# Patient Record
Sex: FEMALE | Race: WHITE | NOT HISPANIC OR LATINO | Employment: FULL TIME | URBAN - NONMETROPOLITAN AREA
[De-identification: names, ages, dates, MRNs, and addresses within clinical notes are randomized per-mention and may not be internally consistent; named-entity substitution may affect disease eponyms.]

---

## 2018-04-25 ENCOUNTER — OFFICE VISIT (OUTPATIENT)
Dept: URGENT CARE | Facility: PHYSICIAN GROUP | Age: 23
End: 2018-04-25
Payer: COMMERCIAL

## 2018-04-25 VITALS
DIASTOLIC BLOOD PRESSURE: 72 MMHG | HEART RATE: 84 BPM | WEIGHT: 148 LBS | BODY MASS INDEX: 23.23 KG/M2 | RESPIRATION RATE: 18 BRPM | OXYGEN SATURATION: 97 % | SYSTOLIC BLOOD PRESSURE: 118 MMHG | TEMPERATURE: 98.2 F | HEIGHT: 67 IN

## 2018-04-25 DIAGNOSIS — S39.012A BACK STRAIN, INITIAL ENCOUNTER: ICD-10-CM

## 2018-04-25 PROCEDURE — 99214 OFFICE O/P EST MOD 30 MIN: CPT | Performed by: FAMILY MEDICINE

## 2018-04-25 RX ORDER — KETOROLAC TROMETHAMINE 30 MG/ML
60 INJECTION, SOLUTION INTRAMUSCULAR; INTRAVENOUS ONCE
Status: CANCELLED | OUTPATIENT
Start: 2018-04-25 | End: 2018-04-25

## 2018-04-25 RX ORDER — PREDNISONE 20 MG/1
TABLET ORAL
Qty: 9 TAB | Refills: 0 | Status: CANCELLED | OUTPATIENT
Start: 2018-04-25

## 2018-04-25 RX ORDER — CYCLOBENZAPRINE HCL 10 MG
TABLET ORAL
Qty: 21 TAB | Refills: 0 | Status: SHIPPED | OUTPATIENT
Start: 2018-04-25 | End: 2021-08-01

## 2018-04-25 NOTE — LETTER
April 25, 2018         Patient: Minna Tyler   YOB: 1995   Date of Visit: 4/25/2018           To Whom it May Concern:    Minna Tyler was seen in my clinic on 4/25/2018.    Please excuse from work for 4/25 thru and including 4/27/18 due to medical condition.    If you have any questions or concerns, please don't hesitate to call.        Sincerely,           Anuel Lee M.D.  Electronically Signed

## 2018-04-26 NOTE — PROGRESS NOTES
Chief Complaint:    Chief Complaint   Patient presents with   • Back Pain     Pt states she was lifting the night before, woke up with stiff pain in her lower back.       History of Present Illness:    This is a new problem. She has pain in left mid back and across entire lower back. She thinks she hurt her back at gym on 4/23/18 doing dead lifts with possible incorrect technique using her back some to lift. Her physically demanding job further aggravated the pain. Laying down is more comfortable for the pain. No radiating pain down legs. No loss of bowel/bladder control. She is having trouble sleeping due to pain.      Review of Systems:    Constitutional: Negative for fever, chills, and diaphoresis.   Eyes: Negative for change in vision, photophobia, pain, redness, and discharge.  ENT: Negative for ear pain, ear discharge, hearing loss, tinnitus, nasal congestion, nosebleeds, and sore throat.    Respiratory: Negative for cough, hemoptysis, sputum production, shortness of breath, wheezing, and stridor.    Cardiovascular: Negative for chest pain, palpitations, orthopnea, claudication, leg swelling, and PND.   Gastrointestinal: Negative for abdominal pain, nausea, vomiting, diarrhea, constipation, blood in stool, and melena.   Genitourinary: Negative for dysuria, urinary urgency, urinary frequency, hematuria, and flank pain.   Musculoskeletal: See HPI.  Skin: Negative for rash and itching.   Neurological: Negative for dizziness, tingling, tremors, sensory change, speech change, focal weakness, seizures, loss of consciousness, and headaches.   Endo: Negative for polydipsia.   Heme: Does not bruise/bleed easily.   Psychiatric/Behavioral: Negative for depression, suicidal ideas, hallucinations, memory loss and substance abuse. The patient is not nervous/anxious and does not have insomnia.        Past Medical History:    History reviewed. No pertinent past medical history.    Past Surgical History:    History reviewed.  "No pertinent surgical history.    Social History:    Social History     Social History   • Marital status: Single     Spouse name: N/A   • Number of children: N/A   • Years of education: N/A     Occupational History   • Not on file.     Social History Main Topics   • Smoking status: Never Smoker   • Smokeless tobacco: Never Used   • Alcohol use No      Comment: occassional   • Drug use: No   • Sexual activity: Yes     Partners: Male     Birth control/ protection: Pill, Condom      Comment: single with a SO, unemployed     Other Topics Concern   • Not on file     Social History Narrative   • No narrative on file     Family History:    Family History   Problem Relation Age of Onset   • Other Mother      basically healthy, no chronic conditions   • Other Father      basically healthy   • Cancer Paternal Grandfather       from cancer, origin unknown   • Cancer Maternal Grandmother      maternal family history of breast cancer   • Diabetes Maternal Grandmother      maternal family history of diabetes       Medications:    Current Outpatient Prescriptions on File Prior to Visit   Medication Sig Dispense Refill   • norgestimate-ethinyl estradiol (ORTHO-CYCLEN) 0.25-35 MG-MCG per tablet Take 1 Tab by mouth every day. 28 Tab 11     No current facility-administered medications on file prior to visit.      Allergies:    No Known Allergies      Vitals:    Vitals:    18 1711   BP: 118/72   Pulse: 84   Resp: 18   Temp: 36.8 °C (98.2 °F)   SpO2: 97%   Weight: 67.1 kg (148 lb)   Height: 1.702 m (5' 7\")       Physical Exam:    Constitutional: Vital signs reviewed. Appears well-developed and well-nourished. No acute distress.   Eyes: Sclera white, conjunctivae clear.  ENT: External ears normal. Hearing normal.  Pulmonary/Chest: Respirations non-labored.  Musculoskeletal: Severely decreased lumbar extension otherwise mild-moderately decreased lumbar range of motion due to back pain. Normal gait. No tenderness to palpation. " No muscular atrophy or weakness.  Neurological: Alert and oriented to person, place, and time. Muscle tone normal. Coordination normal. Light touch and sensation normal.  Skin: No rashes or lesions. Warm, dry, normal turgor.  Psychiatric: Normal mood and affect. Behavior is normal. Judgment and thought content normal.       Assessment / Plan:    1. Back strain, initial encounter  - cyclobenzaprine (FLEXERIL) 10 MG Tab; 1 TAB EVERY 8 HOURS ONLY IF NEEDED FOR PAIN, MUSCLE SPASM, AND/OR MUSCLE TIGHTNESS. MAY CAUSE DROWSINESS.  Dispense: 21 Tab; Refill: 0      Work note given - excuse for 4/25 thru and including 4/27/18.     Discussed with her DDX and management options.    Rec'd relative rest.    Declines Toradol IM and Rx for steroid course for anti-inflammatory effect.    Agreeable to medication prescribed.    Follow-up with PCP or urgent care if getting worse or not better with time and above.

## 2019-09-12 ENCOUNTER — OFFICE VISIT (OUTPATIENT)
Dept: URGENT CARE | Facility: PHYSICIAN GROUP | Age: 24
End: 2019-09-12
Payer: COMMERCIAL

## 2019-09-12 VITALS
WEIGHT: 169 LBS | SYSTOLIC BLOOD PRESSURE: 138 MMHG | BODY MASS INDEX: 26.47 KG/M2 | RESPIRATION RATE: 16 BRPM | DIASTOLIC BLOOD PRESSURE: 80 MMHG | OXYGEN SATURATION: 96 % | HEART RATE: 104 BPM | TEMPERATURE: 98 F

## 2019-09-12 DIAGNOSIS — F41.9 ANXIETY: ICD-10-CM

## 2019-09-12 DIAGNOSIS — F32.A DEPRESSION, UNSPECIFIED DEPRESSION TYPE: ICD-10-CM

## 2019-09-12 PROCEDURE — 99214 OFFICE O/P EST MOD 30 MIN: CPT | Performed by: PHYSICIAN ASSISTANT

## 2019-09-12 RX ORDER — HYDROXYZINE HYDROCHLORIDE 10 MG/1
10 TABLET, FILM COATED ORAL 3 TIMES DAILY PRN
Qty: 30 TAB | Refills: 0 | Status: SHIPPED | OUTPATIENT
Start: 2019-09-12 | End: 2021-08-01

## 2019-09-12 ASSESSMENT — ENCOUNTER SYMPTOMS
NERVOUS/ANXIOUS: 1
MEMORY LOSS: 0
HEADACHES: 0
BLURRED VISION: 0
PALPITATIONS: 0
DEPRESSION: 1
RESTLESSNESS: 1
HYPERVENTILATION: 1
HALLUCINATIONS: 0
INSOMNIA: 1
DOUBLE VISION: 0
FEELING OF CHOKING: 1

## 2019-09-12 ASSESSMENT — PATIENT HEALTH QUESTIONNAIRE - PHQ9
CLINICAL INTERPRETATION OF PHQ2 SCORE: 5
5. POOR APPETITE OR OVEREATING: 2 - MORE THAN HALF THE DAYS
SUM OF ALL RESPONSES TO PHQ QUESTIONS 1-9: 21

## 2019-09-12 ASSESSMENT — LIFESTYLE VARIABLES: SUBSTANCE_ABUSE: 1

## 2019-09-12 NOTE — PROGRESS NOTES
Subjective:   Minna Tyler is a 23 y.o. female who presents today with   Chief Complaint   Patient presents with   • Anxiety     x 1 week    • Depression     Patient's mother is present  Anxiety   Presents for initial visit. Onset was in the past 7 days. The problem has been unchanged. Symptoms include excessive worry, feeling of choking, hyperventilation, insomnia, nervous/anxious behavior and restlessness. Patient reports no chest pain, palpitations or suicidal ideas. Symptoms occur most days. The severity of symptoms is interfering with daily activities. Exacerbated by: Patient states she does not want to leave the house. The quality of sleep is poor.     Risk factors include alcohol intake. Her past medical history is significant for anxiety/panic attacks and depression.   Depression   Pertinent negatives include no chest pain or headaches.   Patient states that she has had worsening of depression and anxiety which she has been dealing with for a long time now.  She states that has gotten worse since being switched to night shift at work.  Patient denies any plan of hurting herself or any interest in hurting herself but she does feel as though she does not want to exist.  Patient does states she has been using alcohol and marijuana.  Patient states that marijuana has helped her sleep and relax.  It has also increased her appetite.  Patient denies any recent triggers or issues that have been going on.  Patient does report that she has a strong support system and lives at home with her parents and has friends.  She is interested in seeking counseling at this point.  Patient states she has anxiety when in public and has been unable to go to work. She states she would rather stay at home. She denies history of self harm.   PMH:  has no past medical history on file.  MEDS:   Current Outpatient Medications:   •  hydrOXYzine HCl (ATARAX) 10 MG Tab, Take 1 Tab by mouth 3 times a day as needed for Anxiety., Disp: 30  Tab, Rfl: 0  •  cyclobenzaprine (FLEXERIL) 10 MG Tab, 1 TAB EVERY 8 HOURS ONLY IF NEEDED FOR PAIN, MUSCLE SPASM, AND/OR MUSCLE TIGHTNESS. MAY CAUSE DROWSINESS. (Patient not taking: Reported on 9/12/2019), Disp: 21 Tab, Rfl: 0  •  norgestimate-ethinyl estradiol (ORTHO-CYCLEN) 0.25-35 MG-MCG per tablet, Take 1 Tab by mouth every day. (Patient not taking: Reported on 9/12/2019), Disp: 28 Tab, Rfl: 11  ALLERGIES: No Known Allergies  SURGHX: History reviewed. No pertinent surgical history.  SOCHX:  reports that she has never smoked. She has never used smokeless tobacco. She reports that she does not drink alcohol or use drugs.  FH: Reviewed with patient, not pertinent to this visit.     Review of Systems   Eyes: Negative for blurred vision and double vision.   Cardiovascular: Negative for chest pain and palpitations.   Neurological: Negative for headaches.   Psychiatric/Behavioral: Positive for depression and substance abuse (alcohol/marijuana). Negative for hallucinations, memory loss and suicidal ideas. The patient is nervous/anxious and has insomnia.    All other systems reviewed and are negative.       Objective:   /80   Pulse (!) 104   Temp 36.7 °C (98 °F) (Temporal)   Resp 16   Wt 76.7 kg (169 lb)   SpO2 96%   BMI 26.47 kg/m²   Physical Exam   Constitutional: She appears well-developed and well-nourished. No distress.   HENT:   Head: Normocephalic and atraumatic.   Right Ear: Hearing normal.   Left Ear: Hearing normal.   Eyes: Pupils are equal, round, and reactive to light.   Cardiovascular: Normal rate, regular rhythm and normal heart sounds.   Pulmonary/Chest: Effort normal and breath sounds normal.   Musculoskeletal:   Normal movement in all 4 extremities   Neurological: She is alert. Coordination normal.   Skin: Skin is warm and dry.   Psychiatric: Her mood appears anxious. She exhibits a depressed mood.   Nursing note and vitals reviewed.    Assessment/Plan:   Assessment    1. Anxiety  - REFERRAL  TO BEHAVIORAL HEALTH  - hydrOXYzine HCl (ATARAX) 10 MG Tab; Take 1 Tab by mouth 3 times a day as needed for Anxiety.  Dispense: 30 Tab; Refill: 0    2. Depression, unspecified depression type  - REFERRAL TO BEHAVIORAL HEALTH  Patient will not take Hydroxizine with alcohol. Patient will follow up with behavioral health for counseling.    Patient given instructions and understanding of medications and treatment.    If not improving in 3-5 days, F/U with PCP or return to  if symptoms worsen.  Strict ER precautions given of any SI/HI occur.   Patient agreeable to plan.      Please note that this dictation was created using voice recognition software. I have made every reasonable attempt to correct obvious errors, but I expect that there are errors of grammar and possibly content that I did not discover before finalizing the note.    Hipolito Garza PA-C

## 2020-03-09 ENCOUNTER — OFFICE VISIT (OUTPATIENT)
Dept: URGENT CARE | Facility: PHYSICIAN GROUP | Age: 25
End: 2020-03-09
Payer: COMMERCIAL

## 2020-03-09 VITALS
TEMPERATURE: 98 F | SYSTOLIC BLOOD PRESSURE: 122 MMHG | RESPIRATION RATE: 14 BRPM | DIASTOLIC BLOOD PRESSURE: 82 MMHG | OXYGEN SATURATION: 95 % | HEART RATE: 88 BPM | BODY MASS INDEX: 25.88 KG/M2 | WEIGHT: 164.9 LBS | HEIGHT: 67 IN

## 2020-03-09 DIAGNOSIS — M94.0 COSTOCHONDRITIS: ICD-10-CM

## 2020-03-09 DIAGNOSIS — R05.9 COUGH: ICD-10-CM

## 2020-03-09 PROCEDURE — 99214 OFFICE O/P EST MOD 30 MIN: CPT | Performed by: NURSE PRACTITIONER

## 2020-03-09 RX ORDER — IBUPROFEN 200 MG
200 TABLET ORAL EVERY 6 HOURS PRN
COMMUNITY
End: 2021-08-01

## 2020-03-09 RX ORDER — METHYLPREDNISOLONE 4 MG/1
TABLET ORAL
Qty: 21 TAB | Refills: 0 | Status: SHIPPED | OUTPATIENT
Start: 2020-03-09 | End: 2021-08-01

## 2020-03-09 SDOH — HEALTH STABILITY: MENTAL HEALTH: HOW OFTEN DO YOU HAVE A DRINK CONTAINING ALCOHOL?: 2-4 TIMES A MONTH

## 2020-03-09 ASSESSMENT — ENCOUNTER SYMPTOMS
MYALGIAS: 0
DIZZINESS: 0
CHILLS: 0
SHORTNESS OF BREATH: 0
SORE THROAT: 0
WHEEZING: 0
EYE PAIN: 0
VOMITING: 0
NAUSEA: 0
FEVER: 0
COUGH: 1

## 2020-03-09 NOTE — PROGRESS NOTES
"Subjective:   Minna Tyler  is a 24 y.o. female who presents for Cough (had a cough for about a week that is pretty much gone but she is having pain in her right lung/chest )        Cough   This is a new problem. The current episode started in the past 7 days. The problem has been unchanged. The problem occurs constantly. The cough is non-productive. Pertinent negatives include no chest pain, chills, fever, myalgias, rash, sore throat, shortness of breath or wheezing. Associated symptoms comments: Right side chest wall pain  . Exacerbated by: coughing, deep breathing. She has tried OTC cough suppressant for the symptoms. The treatment provided no relief. There is no history of asthma, bronchitis or pneumonia.     Review of Systems   Constitutional: Negative for chills, fever and malaise/fatigue.   HENT: Negative for sore throat.    Eyes: Negative for pain.   Respiratory: Positive for cough. Negative for shortness of breath and wheezing.    Cardiovascular: Negative for chest pain.   Gastrointestinal: Negative for nausea and vomiting.   Genitourinary: Negative for hematuria.   Musculoskeletal: Negative for myalgias.        Chest wall pain     Skin: Negative for rash.   Neurological: Negative for dizziness.     No Known Allergies   Objective:   /82   Pulse 88   Temp 36.7 °C (98 °F) (Temporal)   Resp 14   Ht 1.702 m (5' 7\")   Wt 74.8 kg (164 lb 14.4 oz)   SpO2 95%   BMI 25.83 kg/m²   Physical Exam  Vitals signs and nursing note reviewed.   Constitutional:       General: She is not in acute distress.     Appearance: She is well-developed.   HENT:      Head: Normocephalic and atraumatic.      Right Ear: Tympanic membrane and external ear normal.      Left Ear: Tympanic membrane and external ear normal.      Nose: Nose normal.      Right Sinus: No maxillary sinus tenderness or frontal sinus tenderness.      Left Sinus: No maxillary sinus tenderness or frontal sinus tenderness.      Mouth/Throat:      Mouth: " Mucous membranes are moist.      Pharynx: Uvula midline. No posterior oropharyngeal erythema.      Tonsils: No tonsillar exudate or tonsillar abscesses.   Eyes:      General:         Right eye: No discharge.         Left eye: No discharge.      Conjunctiva/sclera: Conjunctivae normal.      Pupils: Pupils are equal, round, and reactive to light.   Cardiovascular:      Rate and Rhythm: Normal rate and regular rhythm.      Heart sounds: No murmur.   Pulmonary:      Effort: Pulmonary effort is normal. No respiratory distress.      Breath sounds: Normal breath sounds.   Chest:      Chest wall: Tenderness present. No swelling, crepitus or edema.      Breasts: Breasts are symmetrical.          Comments: Exquisitely tender to palpation of the intercostal space.  Chest wall symmetrical  Abdominal:      General: There is no distension.      Palpations: Abdomen is soft.      Tenderness: There is no abdominal tenderness.   Musculoskeletal: Normal range of motion.   Skin:     General: Skin is warm and dry.   Neurological:      General: No focal deficit present.      Mental Status: She is alert and oriented to person, place, and time. Mental status is at baseline.      Gait: Gait (gait at baseline) normal.   Psychiatric:         Judgment: Judgment normal.           Assessment/Plan:     1. Costochondritis  methylPREDNISolone (MEDROL DOSEPAK) 4 MG Tablet Therapy Pack   2. Cough       Patient declining Toradol injection  Advised may take 600-800 mg ibuprofen 3 times daily as needed for pain.   Differential diagnosis, natural history, supportive care, and indications for immediate follow-up discussed.

## 2020-03-09 NOTE — LETTER
March 9, 2020         Patient: Minna Tyler   YOB: 1995   Date of Visit: 3/9/2020           To Whom it May Concern:    Minna Tyler was seen in my clinic on 3/9/2020. She may return to work on 3/9/2020. Please allow light duty work for one week.     If you have any questions or concerns, please don't hesitate to call.        Sincerely,           HEATHER Woods.  Electronically Signed

## 2020-03-23 ENCOUNTER — OFFICE VISIT (OUTPATIENT)
Dept: URGENT CARE | Facility: PHYSICIAN GROUP | Age: 25
End: 2020-03-23
Payer: COMMERCIAL

## 2020-03-23 ENCOUNTER — APPOINTMENT (OUTPATIENT)
Dept: RADIOLOGY | Facility: IMAGING CENTER | Age: 25
End: 2020-03-23
Attending: INTERNAL MEDICINE
Payer: COMMERCIAL

## 2020-03-23 VITALS
HEART RATE: 74 BPM | WEIGHT: 171 LBS | SYSTOLIC BLOOD PRESSURE: 120 MMHG | BODY MASS INDEX: 26.78 KG/M2 | TEMPERATURE: 97.5 F | DIASTOLIC BLOOD PRESSURE: 82 MMHG | RESPIRATION RATE: 16 BRPM | OXYGEN SATURATION: 98 %

## 2020-03-23 DIAGNOSIS — R05.9 COUGH: ICD-10-CM

## 2020-03-23 DIAGNOSIS — R07.1 PAIN OF ANTERIOR CHEST WALL WITH RESPIRATION: ICD-10-CM

## 2020-03-23 PROCEDURE — 71101 X-RAY EXAM UNILAT RIBS/CHEST: CPT | Mod: TC,FY,RT | Performed by: INTERNAL MEDICINE

## 2020-03-23 PROCEDURE — 99204 OFFICE O/P NEW MOD 45 MIN: CPT | Mod: 25 | Performed by: INTERNAL MEDICINE

## 2020-03-23 PROCEDURE — 99406 BEHAV CHNG SMOKING 3-10 MIN: CPT | Performed by: INTERNAL MEDICINE

## 2020-03-23 RX ORDER — MELOXICAM 15 MG/1
15 TABLET ORAL DAILY
Qty: 10 TAB | Refills: 0 | Status: SHIPPED | OUTPATIENT
Start: 2020-03-23 | End: 2020-04-02

## 2020-03-23 ASSESSMENT — ENCOUNTER SYMPTOMS
SPUTUM PRODUCTION: 0
DIZZINESS: 0
COUGH: 1
FEVER: 0
SHORTNESS OF BREATH: 0

## 2020-03-23 ASSESSMENT — PAIN SCALES - GENERAL: PAINLEVEL: 4=SLIGHT-MODERATE PAIN

## 2020-03-23 NOTE — PROGRESS NOTES
Subjective:     Minna Tyler is a 24 y.o. female who presents for Rib Pain (R rib radiates to her R mid back / Pt was here last 2wks ago for the same reason )       Chest Pain    This is a new problem. The current episode started 1 to 4 weeks ago. The onset quality is gradual. The problem occurs constantly. The problem has been gradually worsening. The pain is present in the lateral region. The pain is moderate. The quality of the pain is described as sharp. The pain does not radiate. Associated symptoms include a cough (Patient had bronchitis more than a month ago that has gotten better but now she has smoker's cough). Pertinent negatives include no dizziness, fever, shortness of breath or sputum production.   History reviewed. No pertinent past medical history.History reviewed. No pertinent surgical history.  Social History     Socioeconomic History   • Marital status: Single     Spouse name: Not on file   • Number of children: Not on file   • Years of education: Not on file   • Highest education level: Not on file   Occupational History   • Not on file   Social Needs   • Financial resource strain: Not on file   • Food insecurity     Worry: Not on file     Inability: Not on file   • Transportation needs     Medical: Not on file     Non-medical: Not on file   Tobacco Use   • Smoking status: Current Every Day Smoker     Types: Cigarettes   • Smokeless tobacco: Never Used   Substance and Sexual Activity   • Alcohol use: Yes     Frequency: 2-4 times a month     Comment: occassional   • Drug use: Yes     Types: Marijuana     Comment: daily   • Sexual activity: Yes     Partners: Male     Birth control/protection: Pill, Condom     Comment: single with a SO, unemployed   Lifestyle   • Physical activity     Days per week: Not on file     Minutes per session: Not on file   • Stress: Not on file   Relationships   • Social connections     Talks on phone: Not on file     Gets together: Not on file     Attends Anabaptism  service: Not on file     Active member of club or organization: Not on file     Attends meetings of clubs or organizations: Not on file     Relationship status: Not on file   • Intimate partner violence     Fear of current or ex partner: Not on file     Emotionally abused: Not on file     Physically abused: Not on file     Forced sexual activity: Not on file   Other Topics Concern   • Not on file   Social History Narrative   • Not on file      Family History   Problem Relation Age of Onset   • Other Mother         basically healthy, no chronic conditions   • Other Father         basically healthy   • Cancer Paternal Grandfather          from cancer, origin unknown   • Cancer Maternal Grandmother         maternal family history of breast cancer   • Diabetes Maternal Grandmother         maternal family history of diabetes    Review of Systems   Constitutional: Negative for fever.   Respiratory: Positive for cough (Patient had bronchitis more than a month ago that has gotten better but now she has smoker's cough). Negative for sputum production and shortness of breath.    Cardiovascular: Positive for chest pain.   Neurological: Negative for dizziness.   All other systems reviewed and are negative.  No Known Allergies   Objective:   /82   Pulse 74   Temp 36.4 °C (97.5 °F) (Temporal)   Resp 16   Wt 77.6 kg (171 lb)   SpO2 98%   BMI 26.78 kg/m²   Physical Exam  Constitutional:       General: She is not in acute distress.     Appearance: She is well-developed.   HENT:      Head: Normocephalic and atraumatic.      Mouth/Throat:      Mouth: Mucous membranes are moist.      Pharynx: Oropharynx is clear.   Eyes:      Conjunctiva/sclera: Conjunctivae normal.   Neck:      Musculoskeletal: No neck rigidity.   Cardiovascular:      Rate and Rhythm: Normal rate and regular rhythm.   Pulmonary:      Effort: Pulmonary effort is normal. No respiratory distress.      Breath sounds: Normal breath sounds.   Chest:       Chest wall: Tenderness (Chest pain on the right anterior chest wall that is reproducible) present.   Lymphadenopathy:      Cervical: No cervical adenopathy.   Skin:     General: Skin is warm and dry.      Capillary Refill: Capillary refill takes less than 2 seconds.   Neurological:      Mental Status: She is alert and oriented to person, place, and time.      Sensory: No sensory deficit.      Deep Tendon Reflexes: Reflexes are normal and symmetric.   Psychiatric:         Mood and Affect: Mood normal.         Behavior: Behavior normal.           Assessment/Plan:   Assessment    1. Pain of anterior chest wall with respiration  - meloxicam (MOBIC) 15 MG tablet; Take 1 Tab by mouth every day for 10 days. No motrin,alleve or aspirin  Dispense: 10 Tab; Refill: 0  - [id number] (WITH 1-VIEW CXR) RIGHT; Future    2. Cough    FINDINGS:  No displaced fractures or acute bony changes are noted.  There is no evidence of a hemo or pneumothorax.     IMPRESSION:     Negative right rib series.          Tobacco use cessation counseling greater than 3 minutes was given including 800 quit now, gum, patch, Chantix    High risk conditions including including PE, pneumonia, rib fracture, lung contusion, pneumothorax was considered in the differential diagnosis  Differential diagnosis, natural history, supportive care, and indications for immediate follow-up discussed.

## 2021-08-01 ENCOUNTER — APPOINTMENT (OUTPATIENT)
Dept: URGENT CARE | Facility: PHYSICIAN GROUP | Age: 26
End: 2021-08-01
Payer: COMMERCIAL

## 2021-08-01 ENCOUNTER — HOSPITAL ENCOUNTER (OUTPATIENT)
Facility: MEDICAL CENTER | Age: 26
End: 2021-08-01
Attending: PHYSICIAN ASSISTANT
Payer: OTHER GOVERNMENT

## 2021-08-01 ENCOUNTER — OFFICE VISIT (OUTPATIENT)
Dept: URGENT CARE | Facility: PHYSICIAN GROUP | Age: 26
End: 2021-08-01

## 2021-08-01 VITALS
RESPIRATION RATE: 14 BRPM | WEIGHT: 183 LBS | HEART RATE: 68 BPM | HEIGHT: 67 IN | SYSTOLIC BLOOD PRESSURE: 122 MMHG | OXYGEN SATURATION: 99 % | DIASTOLIC BLOOD PRESSURE: 78 MMHG | TEMPERATURE: 98.8 F | BODY MASS INDEX: 28.72 KG/M2

## 2021-08-01 DIAGNOSIS — R68.89 FLU-LIKE SYMPTOMS: ICD-10-CM

## 2021-08-01 PROCEDURE — 99213 OFFICE O/P EST LOW 20 MIN: CPT | Performed by: PHYSICIAN ASSISTANT

## 2021-08-01 PROCEDURE — U0003 INFECTIOUS AGENT DETECTION BY NUCLEIC ACID (DNA OR RNA); SEVERE ACUTE RESPIRATORY SYNDROME CORONAVIRUS 2 (SARS-COV-2) (CORONAVIRUS DISEASE [COVID-19]), AMPLIFIED PROBE TECHNIQUE, MAKING USE OF HIGH THROUGHPUT TECHNOLOGIES AS DESCRIBED BY CMS-2020-01-R: HCPCS

## 2021-08-01 PROCEDURE — U0005 INFEC AGEN DETEC AMPLI PROBE: HCPCS

## 2021-08-01 NOTE — PROGRESS NOTES
"Chief Complaint   Patient presents with   • Altered Taste     x7 days   • Diarrhea     x2 days   • Nasal Congestion     body aches       HISTORY OF PRESENT ILLNESS: Patient is a 25 y.o. female who presents today for the following:    Body aches started   Loss of taste/smell  + loose stool  Had 2 \"at home COVID tests\" come back positive  No h/o COVID vaccine    Patient Active Problem List    Diagnosis Date Noted   • Contraception management 2013   • Right hip pain 2013       Allergies:Patient has no known allergies.    No current [facility]-ordered outpatient medications on file.     No current [facility]-ordered facility-administered medications on file.       History reviewed. No pertinent past medical history.    Social History     Tobacco Use   • Smoking status: Current Every Day Smoker     Types: Cigarettes   • Smokeless tobacco: Never Used   Substance Use Topics   • Alcohol use: Yes     Comment: occassional   • Drug use: Not Currently     Types: Marijuana     Comment: daily       Family Status   Relation Name Status   • Mo  Alive        35 in    • Fa  Alive        36 in    • PGFa   at age 24        cancer unknown origin   • MGMo  (Not Specified)     Family History   Problem Relation Age of Onset   • Other Mother         basically healthy, no chronic conditions   • Other Father         basically healthy   • Cancer Paternal Grandfather          from cancer, origin unknown   • Cancer Maternal Grandmother         maternal family history of breast cancer   • Diabetes Maternal Grandmother         maternal family history of diabetes       Review of Systems:   Constitutional ROS: No unexpected change in weight  Pulmonary ROS: No chronic cough, sputum, or hemoptysis, No dyspnea on exertion, No wheezing  Cardiovascular ROS: No diaphoresis, No edema, No palpitations  Hematologic/Lymphatic ROS: No chills, No night sweats, No weight loss  Skin/Integumentary ROS: No edema, No evidence of rash, No " "itching    Exam:  /78   Pulse 68   Temp 37.1 °C (98.8 °F)   Resp 14   Ht 1.702 m (5' 7\")   Wt 83 kg (183 lb)   SpO2 99%   General: Well developed, well nourished. No distress.    Eye: PERRL/EOMI; conjunctivae clear, lids normal.  ENMT: Lips without lesions, MMM. Oropharynx is clear. Bilateral TMs are within normal limits.  Pulmonary: Unlabored respiratory effort. Lungs clear to auscultation, no wheezes, no rhonchi.    Cardiovascular: Regular rate and rhythm without murmur.   Neurologic: Grossly nonfocal. No facial asymmetry noted.  Lymph: No cervical lymphadenopathy noted.  Skin: Warm, dry, good turgor. No rashes in visible areas.   Psych: Normal mood. Alert and oriented to person, place and time.    Assessment/Plan:  Discussed likely viral etiology.  Vitals and exam are unremarkable.  Low suspicion for pneumonia. Patient will be tested for COVID and has been advised to quarantine until results are available. Discussed appropriate over-the-counter symptomatic medication, and when to return to clinic. Follow up for worsening or persistent symptoms.  1. Flu-like symptoms  SARS-CoV-2, PCR (In-House): Collect NP OR nasal swab in [facility]       "

## 2021-08-01 NOTE — LETTER
August 1, 2021         Patient: Minna Tyler   YOB: 1995   Date of Visit: 8/1/2021           To Whom it May Concern:    Minna Tyler was seen in my clinic on 8/1/2021.     Please excuse patient for missing school/work until COVID results are available, typically taking 1-3 days.  They have been advised to quarantine during this time.      If you have any questions or concerns, please don't hesitate to call.        Sincerely,           Brenda White P.A.-C.  Electronically Signed

## 2021-08-02 DIAGNOSIS — R68.89 FLU-LIKE SYMPTOMS: ICD-10-CM

## 2021-08-03 LAB
COVID ORDER STATUS COVID19: NORMAL
SARS-COV-2 RNA RESP QL NAA+PROBE: DETECTED
SPECIMEN SOURCE: ABNORMAL

## 2022-05-24 NOTE — LETTER
March 23, 2020       Patient: Minna Tyler   YOB: 1995   Date of Visit: 3/23/2020         To Whom It May Concern:    It is my medical opinion that Minna Tyler remain out of work until 3/24/20 and no lifting more than 10 lbs till 3/30/20.    If you have any questions or concerns, please don't hesitate to call [phone]          Sincerely,          Torres Downey M.D.  Electronically Signed     
4 = No assist / stand by assistance

## 2022-05-25 ENCOUNTER — OFFICE VISIT (OUTPATIENT)
Dept: URGENT CARE | Facility: PHYSICIAN GROUP | Age: 27
End: 2022-05-25
Payer: COMMERCIAL

## 2022-05-25 VITALS
BODY MASS INDEX: 27.31 KG/M2 | OXYGEN SATURATION: 97 % | SYSTOLIC BLOOD PRESSURE: 122 MMHG | DIASTOLIC BLOOD PRESSURE: 62 MMHG | TEMPERATURE: 98.6 F | HEIGHT: 67 IN | HEART RATE: 79 BPM | RESPIRATION RATE: 18 BRPM | WEIGHT: 174 LBS

## 2022-05-25 DIAGNOSIS — Z48.02 ENCOUNTER FOR REMOVAL OF SUTURES: ICD-10-CM

## 2022-05-25 DIAGNOSIS — S81.011A LACERATION OF RIGHT KNEE, INITIAL ENCOUNTER: ICD-10-CM

## 2022-05-25 PROCEDURE — 99213 OFFICE O/P EST LOW 20 MIN: CPT | Performed by: NURSE PRACTITIONER

## 2022-05-25 ASSESSMENT — ENCOUNTER SYMPTOMS
FEVER: 0
BRUISES/BLEEDS EASILY: 0
CHILLS: 0
MYALGIAS: 0
WEAKNESS: 0
TINGLING: 0
SENSORY CHANGE: 0

## 2022-05-25 NOTE — LETTER
May 25, 2022    To Whom It May Concern:         This is confirmation that Minna Tyler attended her scheduled appointment with [facility] on 5/25/22. Please allow light duty for today through next 3 days. Please avoid climbing stairs and ladders, and no push/pull, carry/lift > 25 pounds or squatting down.           If you have any questions please do not hesitate to call me at the phone number listed below.    Sincerely,          Bety Coffman A.P.R.N.  [phone]

## 2022-05-25 NOTE — PROGRESS NOTES
"Subjective     Minna Tyler is a 26 y.o. female who presents with Suture / Staple Removal (Suture removal. Placed 10 days ago. 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 Applying triple antibiotic ointment with gauze bandage. 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     PMH:  has no past medical history on file.  MEDS: No current outpatient medications on file.  ALLERGIES: No Known Allergies  SURGHX: History reviewed. No pertinent surgical history.  SOCHX:  reports that she has been smoking cigarettes. She has never used smokeless tobacco. She reports current alcohol use. She reports previous drug use. Drug: Marijuana.  FH: Family history was reviewed, no pertinent findings to report    Review of Systems   Constitutional: Negative for chills, fever and malaise/fatigue.   Musculoskeletal: Negative for joint pain and myalgias.   Skin: Negative for itching and rash.        Suture removal from right knee laceration.    Neurological: Negative for tingling, sensory change and weakness.   Endo/Heme/Allergies: Does not bruise/bleed easily.   All other systems reviewed and are negative.             Objective     /62   Pulse 79   Temp 37 °C (98.6 °F) (Temporal)   Resp 18   Ht 1.702 m (5' 7\")   Wt 78.9 kg (174 lb)   SpO2 97%   BMI 27.25 kg/m²      Physical Exam  Vitals reviewed.   Constitutional:       General: She is awake. She is not in acute distress.     Appearance: Normal appearance. She is well-developed. She is not ill-appearing, toxic-appearing or diaphoretic.   HENT:      Head: Normocephalic.   Cardiovascular:      Rate and Rhythm: Normal rate.   Pulmonary:      Effort: Pulmonary effort is normal.   Musculoskeletal:         General: Tenderness present. No deformity.      Right knee: No swelling, deformity, " effusion, erythema, ecchymosis, lacerations, bony tenderness or crepitus. Normal range of motion. No tenderness. No LCL laxity, MCL laxity, ACL laxity or PCL laxity. Normal alignment, normal meniscus and normal patellar mobility. Normal pulse.   Skin:     General: Skin is warm and dry.      Findings: Signs of injury, laceration and wound present. No abrasion, bruising, ecchymosis or erythema.      Comments: Intact suture line with scabbing. Removed sutures without difficulty or pain with med assist. Applied telfa and ace wrap.    Neurological:      Mental Status: She is alert and oriented to person, place, and time.   Psychiatric:         Behavior: Behavior is cooperative.                             Assessment & Plan        1. Laceration of right knee, initial encounter      2. Encounter for removal of sutures      -May use over the counter NSAID as needed for discomfort  -Keep bandage clean and dry, remove and change daily, if soiled or wet remove and replace  -Mild soap and water, do not scrub, pat dry  -May use triple antibiotic ointment after each bandage change and cleaning  -May use ice for any swelling or discomfort  -Monitor for increased swelling, redness and increased heat to site, discharge, fever, red streaking- need re-evaluation immediately  Work note provided

## 2022-08-02 ENCOUNTER — OFFICE VISIT (OUTPATIENT)
Dept: URGENT CARE | Facility: PHYSICIAN GROUP | Age: 27
End: 2022-08-02
Payer: COMMERCIAL

## 2022-08-02 VITALS
RESPIRATION RATE: 18 BRPM | WEIGHT: 171 LBS | HEART RATE: 88 BPM | OXYGEN SATURATION: 96 % | TEMPERATURE: 97 F | SYSTOLIC BLOOD PRESSURE: 132 MMHG | DIASTOLIC BLOOD PRESSURE: 72 MMHG | HEIGHT: 67 IN | BODY MASS INDEX: 26.84 KG/M2

## 2022-08-02 DIAGNOSIS — Z32.01 PREGNANCY CONFIRMED BY POSITIVE URINE TEST: ICD-10-CM

## 2022-08-02 LAB
INT CON NEG: NORMAL
INT CON POS: NORMAL
POC URINE PREGNANCY TEST: POSITIVE

## 2022-08-02 PROCEDURE — 81025 URINE PREGNANCY TEST: CPT | Performed by: FAMILY MEDICINE

## 2022-08-02 PROCEDURE — 99213 OFFICE O/P EST LOW 20 MIN: CPT | Performed by: FAMILY MEDICINE

## 2022-08-02 NOTE — LETTER
August 2, 2022         Patient: Minna Tyler   YOB: 1995   Date of Visit: 8/2/2022           To Whom it May Concern:    Minna Tyler was seen in my clinic on 8/2/2022.     She is currently pregnant, confirmed by our testing today.    Please limit her lifting to 25 pounds or less.    If you have any questions or concerns, please don't hesitate to call.        Sincerely,           Anuel Lee M.D.  Electronically Signed

## 2022-08-02 NOTE — PROGRESS NOTES
Chief Complaint:    Chief Complaint   Patient presents with   • Pregnancy Test     Requesting. 4 POS at home test today       History of Present Illness:    4 positive home pregnancy tests today. Last menstrual period -22. Trouble finding OB. Requesting note for work to limit lifting - she has to sometimes lift 200 pounds at work.      Past Medical History:    History reviewed. No pertinent past medical history.    Past Surgical History:    History reviewed. No pertinent surgical history.    Social History:    Social History     Socioeconomic History   • Marital status: Single     Spouse name: Not on file   • Number of children: Not on file   • Years of education: Not on file   • Highest education level: Not on file   Occupational History   • Not on file   Tobacco Use   • Smoking status: Current Every Day Smoker     Types: Cigarettes   • Smokeless tobacco: Never Used   Substance and Sexual Activity   • Alcohol use: Yes     Comment: occassional   • Drug use: Not Currently     Types: Marijuana     Comment: daily   • Sexual activity: Yes     Partners: Male     Birth control/protection: Pill, Condom     Comment: single with a SO, unemployed   Other Topics Concern   • Not on file   Social History Narrative   • Not on file     Social Determinants of Health     Financial Resource Strain: Not on file   Food Insecurity: Not on file   Transportation Needs: Not on file   Physical Activity: Not on file   Stress: Not on file   Social Connections: Not on file   Intimate Partner Violence: Not on file   Housing Stability: Not on file     Family History:    Family History   Problem Relation Age of Onset   • Other Mother         basically healthy, no chronic conditions   • Other Father         basically healthy   • Cancer Paternal Grandfather          from cancer, origin unknown   • Cancer Maternal Grandmother         maternal family history of breast cancer   • Diabetes Maternal Grandmother         maternal family  "history of diabetes     Medications:    No current outpatient medications on file prior to visit.     No current facility-administered medications on file prior to visit.     Allergies:    No Known Allergies      Vitals:    Vitals:    22 1356   BP: 132/72   Pulse: 88   Resp: 18   Temp: 36.1 °C (97 °F)   TempSrc: Temporal   SpO2: 96%   Weight: 77.6 kg (171 lb)   Height: 1.702 m (5' 7\")       Physical Exam:    Constitutional: Vital signs reviewed. Appears well-developed and well-nourished. No acute distress.   Eyes: Sclera white, conjunctivae clear.   ENT: External ears normal. Hearing normal.   Neck: Neck supple.   Pulmonary/Chest: Respirations non-labored.   Musculoskeletal: Normal gait. No muscular atrophy or weakness.  Neurological: Alert and oriented to person, place, and time. Muscle tone normal. Coordination normal.   Skin: No rashes or lesions. Warm, dry, normal turgor.  Psychiatric: Normal mood and affect. Behavior is normal. Judgment and thought content normal.       Diagnostics:    POCT PREGNANCY  Order: [id number]   Status: Final result     Visible to patient: No (scheduled for 8/3/2022 12:25 PM)     Next appt: None     Dx: Pregnancy confirmed by positive urine...     0 Result Notes    Component Ref Range & Units  2:25 PM    POC Urine Pregnancy Test Negative POSITIVE    Internal Control Positive  Valid    Internal Control Negative  Valid    Resulting [facility] [employer]              Specimen Collected: 22  2:25 PM Last Resulted: 22  2:25 PM             Medical Decision Makin. Pregnancy confirmed by positive urine test  - POCT PREGNANCY  - Referral to OB/Gyn      Discussed with her DDX, management options, and risks, benefits, and alternatives to treatment plan agreed upon.    4 positive home pregnancy tests today. Last menstrual period -22. Trouble finding OB. Requesting note for work to limit lifting - she has to sometimes lift 200 pounds at work.    Urine HCG is " positive.    Work note provided - She is currently pregnant, confirmed by our testing today. Please limit her lifting to 25 pounds or less.    Advised to avoid ETOH, tobacco, recreational drugs.     Recommended to take OTC PNVs and see OB. OB referral ordered.     She will return to urgent care if needed.

## 2022-08-30 ENCOUNTER — OFFICE VISIT (OUTPATIENT)
Dept: URGENT CARE | Facility: PHYSICIAN GROUP | Age: 27
End: 2022-08-30
Payer: COMMERCIAL

## 2022-08-30 VITALS
RESPIRATION RATE: 16 BRPM | HEIGHT: 67 IN | DIASTOLIC BLOOD PRESSURE: 68 MMHG | SYSTOLIC BLOOD PRESSURE: 118 MMHG | BODY MASS INDEX: 26.84 KG/M2 | TEMPERATURE: 98 F | HEART RATE: 88 BPM | WEIGHT: 171 LBS | OXYGEN SATURATION: 100 %

## 2022-08-30 DIAGNOSIS — B97.89 VIRAL RESPIRATORY ILLNESS: ICD-10-CM

## 2022-08-30 DIAGNOSIS — U07.1 COVID-19: ICD-10-CM

## 2022-08-30 DIAGNOSIS — J98.8 VIRAL RESPIRATORY ILLNESS: ICD-10-CM

## 2022-08-30 LAB
EXTERNAL QUALITY CONTROL: ABNORMAL
INT CON NEG: ABNORMAL
INT CON POS: ABNORMAL
SARS-COV+SARS-COV-2 AG RESP QL IA.RAPID: POSITIVE

## 2022-08-30 PROCEDURE — 87426 SARSCOV CORONAVIRUS AG IA: CPT | Performed by: PHYSICIAN ASSISTANT

## 2022-08-30 PROCEDURE — 99213 OFFICE O/P EST LOW 20 MIN: CPT | Mod: CS | Performed by: PHYSICIAN ASSISTANT

## 2022-08-30 ASSESSMENT — ENCOUNTER SYMPTOMS
VOMITING: 0
MYALGIAS: 1
FEVER: 0
CHILLS: 1
SORE THROAT: 1
NAUSEA: 0
EYE REDNESS: 0
COUGH: 1
HEADACHES: 1
SHORTNESS OF BREATH: 0
EYE DISCHARGE: 0
DIARRHEA: 0
RHINORRHEA: 1

## 2022-08-30 NOTE — LETTER
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  A concern for COVID-19 was identified and your employee tested POSITIVE for COVID-19.  Your employee will be able to access test results through our electronic delivery system called [employer].    We are asking you to excuse absences while following self-isolation protocol per Center for Disease Control (CDC) guidelines.      In general, the CDC guidelines require a minimum quarantine of 5 days from the onset of symptoms and without fever, vomiting, or diarrhea as above.  If symptoms persist, quarantine must be extended for an additional 5 days.      In general, repeat testing is not necessary and not offered through our [facility].      This is the only note that will be provided from [facility] for this visit.  Your employee will require an appointment with a primary care provider if FMLA or disability forms are required.       Sincerely,        Arielle Medina P.A.-C.  Electronically Signed

## 2022-08-31 NOTE — PROGRESS NOTES
"Linus Tyler is a 26 y.o. female who presents with Coronavirus Screening (Runny nose, sore throat, since sunday night, at home +. Confirmation for work needed. Pt states she is pregnant )            URI   This is a new problem. Episode onset: x 1-2 days ago. The problem has been unchanged. There has been no fever. Associated symptoms include congestion, coughing, headaches, rhinorrhea and a sore throat. Pertinent negatives include no chest pain, diarrhea, ear pain, nausea or vomiting. She has tried nothing for the symptoms.     The patient states she watched her nephews on Sunday who were sick with cold-like symptoms.  The patient states she developed similar cold-like symptoms on Sunday evening.  The patient states she took an at-home COVID-19 test x1 day ago, which was positive.  The patient states she is newly pregnant.    PMH:  has no past medical history on file.  MEDS: No current outpatient medications on file.  ALLERGIES: No Known Allergies  SURGHX: History reviewed. No pertinent surgical history.  SOCHX:  reports that she has been smoking cigarettes. She has never used smokeless tobacco. She reports current alcohol use. She reports that she does not currently use drugs after having used the following drugs: Marijuana.  FH: Family history was reviewed, no pertinent findings to report    Review of Systems   Constitutional:  Positive for chills. Negative for fever.   HENT:  Positive for congestion, rhinorrhea and sore throat. Negative for ear pain.    Eyes:  Negative for discharge and redness.   Respiratory:  Positive for cough. Negative for shortness of breath.    Cardiovascular:  Negative for chest pain.   Gastrointestinal:  Negative for diarrhea, nausea and vomiting.   Musculoskeletal:  Positive for myalgias.   Neurological:  Positive for headaches.            Objective   /68   Pulse 88   Temp 36.7 °C (98 °F) (Temporal)   Resp 16   Ht 1.702 m (5' 7\")   Wt 77.6 kg (171 lb)   SpO2 " 100%   BMI 26.78 kg/m²      Physical Exam  Constitutional:       General: She is not in acute distress.     Appearance: Normal appearance. She is not ill-appearing.   HENT:      Head: Normocephalic and atraumatic.      Right Ear: External ear normal.      Left Ear: External ear normal.      Nose: Nose normal.      Mouth/Throat:      Mouth: Mucous membranes are moist.      Pharynx: Oropharynx is clear. No posterior oropharyngeal erythema.   Eyes:      Extraocular Movements: Extraocular movements intact.      Conjunctiva/sclera: Conjunctivae normal.   Cardiovascular:      Rate and Rhythm: Normal rate and regular rhythm.      Heart sounds: Normal heart sounds.   Pulmonary:      Effort: Pulmonary effort is normal. No respiratory distress.      Breath sounds: Normal breath sounds. No wheezing.   Musculoskeletal:         General: Normal range of motion.      Cervical back: Normal range of motion and neck supple.   Skin:     General: Skin is warm and dry.   Neurological:      Mental Status: She is alert and oriented to person, place, and time.          Progress:  POCT Rapid COVID-19: POSITIVE              Assessment & Plan          1. Viral respiratory illness  - POCT SARS-COV Antigen KAILA (Symptomatic only)    2. COVID-19    The patient's presenting symptoms and physical exam endings are consistent with a viral respiratory illness.  The patient's physical exam today in clinic was normal.  The patient's lungs are clear to auscultation without wheezing, and her pulse ox was within normal limits.  The patient is nontoxic and appears in no acute distress.  The patient's vital signs are stable and within normal limits.  She is afebrile today in clinic.  Discussed likely viral etiology with the patient.  The patient's POCT rapid COVID-19 testing today in clinic was positive.  This is consistent with the patient's current illness.  Advised patient stay at home under self quarantine per CDC guidelines.  Recommend OTC medications  and supportive care for symptomatic management.  Recommend patient follow-up with her PCP as needed.  Discussed return precautions with patient, and she verbalized understanding.    Differential diagnoses, supportive care, and indications for immediate follow-up discussed with patient.   Instructed to return to clinic or nearest emergency department for any change in condition, further concerns, or worsening of symptoms.    OTC Tylenol or Motrin for fever/discomfort.  OTC cough/cold medication for symptomatic relief  OTC Supportive Care for Congestion - saline nasal spray or neti pot  Drink plenty of fluids  Advised the patient to stay at home under self-isolation until symptoms have been present for at least 5 days and are improved, and there has been no fever for at least 72 hours without the use of medications and/or no vomiting or diarrhea for 48 hours.  Work note provided  Follow-up with PCP  Return to clinic or go to the ED if symptoms worsen or fail to improve, or if the patient should develop worsening/increasing cough, congestion, ear pain, sore throat, shortness of breath, wheezing, chest pain, fever/chills, and/or any concerning symptoms.    Discussed plan with the patient, and she agrees to the above.     I personally reviewed prior external notes and test results pertinent to today's visit.  I have independently reviewed and interpreted all diagnostics ordered during this urgent care visit.     Please note that this dictation was created using voice recognition software. I have made every reasonable attempt to correct obvious errors, but I expect that there may be errors of grammar and possibly content that I did not discover before finalizing the note.     This note was electronically signed by Arielle Medina PA-C